# Patient Record
(demographics unavailable — no encounter records)

---

## 2025-03-20 NOTE — ASSESSMENT
[FreeTextEntry1] : EKG done in the office regular sinus rhythm within normal limits please note patient is non-smoker no family history of any cardiac events, patient is not diabetic or hypertensive chest pain pain is probably noncardiac, costochondritis.  However patient was advised to go to the emergency room for consideration to have troponin done but patient does not want to go.  Patient will have complete blood

## 2025-03-20 NOTE — REVIEW OF SYSTEMS
[Chest Discomfort] : chest discomfort [Negative] : Heme/Lymph [FreeTextEntry5] : Chest pain in upper part of the chest in the center

## 2025-03-20 NOTE — REASON FOR VISIT
[Other: ____] : [unfilled] [FreeTextEntry1] : 43 years old female with history of asthma in the past comes to the office with complaints of pain in the upper part of the chest.  The chest pain is described as mild and pressure like.  Denies any shortness of breath no radiation of the pains  s.h:: Non-smoker nondrinker fh:; Noncontributory

## 2025-03-20 NOTE — PHYSICAL EXAM
[Well Developed] : well developed [Well Nourished] : well nourished [No Acute Distress] : no acute distress [Normal Conjunctiva] : normal conjunctiva [Normal Venous Pressure] : normal venous pressure [No Carotid Bruit] : no carotid bruit [Normal S1, S2] : normal S1, S2 [No Murmur] : no murmur [No Rub] : no rub [No Gallop] : no gallop [Clear Lung Fields] : clear lung fields [Good Air Entry] : good air entry [No Respiratory Distress] : no respiratory distress  [Soft] : abdomen soft [Non Tender] : non-tender [No Masses/organomegaly] : no masses/organomegaly [Normal Bowel Sounds] : normal bowel sounds [Normal Gait] : normal gait [No Edema] : no edema [No Cyanosis] : no cyanosis [No Clubbing] : no clubbing [No Varicosities] : no varicosities [No Rash] : no rash [No Skin Lesions] : no skin lesions [Moves all extremities] : moves all extremities [No Focal Deficits] : no focal deficits [Normal Speech] : normal speech [Alert and Oriented] : alert and oriented [Normal memory] : normal memory [de-identified] : Mild tenderness in the right costochondral juction